# Patient Record
Sex: FEMALE | Race: WHITE | NOT HISPANIC OR LATINO | Employment: FULL TIME | URBAN - METROPOLITAN AREA
[De-identification: names, ages, dates, MRNs, and addresses within clinical notes are randomized per-mention and may not be internally consistent; named-entity substitution may affect disease eponyms.]

---

## 2017-12-04 ENCOUNTER — OFFICE VISIT (OUTPATIENT)
Dept: URGENT CARE | Facility: CLINIC | Age: 22
End: 2017-12-04

## 2017-12-04 PROCEDURE — G0381 LEV 2 HOSP TYPE B ED VISIT: HCPCS

## 2017-12-06 NOTE — PROGRESS NOTES
Assessment    1  Acute tonsillitis (463) (J03 90)    Plan  Acute tonsillitis    · Amoxicillin 875 MG Oral Tablet; TAKE 1 TABLET EVERY 12 HOURS DAILY   · Fluconazole 150 MG Oral Tablet (Diflucan); TAKE 1 TABLET 1 TIME ONLY    Discussion/Summary  Discussion Summary:   Complete amoxicillin as directed  take a probiotic while you are on the antibioticgargles  versus Advil as needed for sore throat symptoms  up with her family doctor if symptoms persist or worsen over the next 3-4 days  Take Diflucan if needed for yeast infection with the antibiotics  Medication Side Effects Reviewed: Possible side effects of new medications were reviewed with the patient/guardian today  Understands and agrees with treatment plan: The treatment plan was reviewed with the patient/guardian  The patient/guardian understands and agrees with the treatment plan      Chief Complaint    1  Sore Throat  Chief Complaint Free Text Note Form: Sore throat started 3 days ago      History of Present Illness  HPI: Patient presents with mother with complaints of sore throat for 3 days  She states she has a history of recurrent strep  She has some left over cephalexin that she started and has taken a few doses  She is a nonsmoker  She denies any extreme fatigue  She denies any associated nausea or vomiting  Hospital Based Practices Required Assessment:  Pain Assessment  the patient states they have pain  The pain is located in the throat  The patient describes the pain as sharp  (on a scale of 0 to 10, the patient rates the pain at 8 )  Abuse And Domestic Violence Screen   Yes, the patient is safe at home  -- The patient states no one is hurting them  Depression And Suicide Screen  No, the patient has not had thoughts of hurting themself  No, the patient has not felt depressed in the past 7 days  Review of Systems  Focused-Female:  Constitutional: No fever, no chills, feels well, no tiredness, no recent weight gain or loss  ENT: sore throat  Cardiovascular: no complaints of slow or fast heart rate, no chest pain, no palpitations, no leg claudication or lower extremity edema  Respiratory: no complaints of shortness of breath, no wheezing, no dyspnea on exertion, no orthopnea or PND  Gastrointestinal: no complaints of abdominal pain, no constipation, no nausea or diarrhea, no vomiting, no bloody stools  Integumentary: no rashes  Past Medical History  1  No pertinent past medical history  Active Problems And Past Medical History Reviewed: The active problems and past medical history were reviewed and updated today  Family History  Family History Reviewed: The family history was reviewed and updated today  Social History   · Never a smoker  Social History Reviewed: The social history was reviewed and updated today  Surgical History    1  History of Cholecystectomy   2  History of Hip Surgery  Surgical History Reviewed: The surgical history was reviewed and updated today  Current Meds   1  Ceftin TABS; Therapy: (Recorded:[id number]) to Recorded  Medication List Reviewed: The medication list was reviewed and updated today  Allergies    1  No Known Drug Allergies    Vitals  Signs   Recorded: [id number] 03:32PM   Temperature: 97 1 F  Heart Rate: 76  Respiration: 20  Systolic: 818  Diastolic: 76  Height: 5 ft 3 in  Weight: 243 lb   BMI Calculated: 43 05  BSA Calculated: 2 1  O2 Saturation: 98    Physical Exam   Constitutional  General appearance: No acute distress, well appearing and well nourished  Eyes  Conjunctiva and lids: No swelling, erythema or discharge  Ears, Nose, Mouth, and Throat  External inspection of ears and nose: Normal    Otoscopic examination: Tympanic membranes translucent with normal light reflex  Canals patent without erythema  Nasal mucosa, septum, and turbinates: Normal without edema or erythema  -- Enlarged tonsils non kissing  Slight exudate    Pulmonary  Respiratory effort: No increased work of breathing or signs of respiratory distress  Auscultation of lungs: Clear to auscultation  Cardiovascular  Palpation of heart: Normal PMI, no thrills  Auscultation of heart: Normal rate and rhythm, normal S1 and S2, without murmurs  Examination of extremities for edema and/or varicosities: Normal    Abdomen  Abdomen: Non-tender, no masses  Liver and spleen: No hepatomegaly or splenomegaly         Signatures   Electronically signed by : Luis Carlos Butler DO; Dec  4 2017  3:54PM EST                       (Author)

## 2018-01-08 ENCOUNTER — OFFICE VISIT (OUTPATIENT)
Dept: URGENT CARE | Facility: CLINIC | Age: 23
End: 2018-01-08

## 2018-01-09 NOTE — PROGRESS NOTES
Assessment   1  's permit physical examination (V70 3) (Z02 4)    Plan   Acute tonsillitis    · Amoxicillin 875 MG Oral Tablet   · Fluconazole 150 MG Oral Tablet (Diflucan)    Discussion/Summary   Discussion Summary:    NORMAL 'S PHYSICAL EXAM  PATIENT IS 'S FORM COMPLETED WITH NO RESTRICTIONS AT THIS TIME +  Chief Complaint   Chief Complaint Free Text Note Form: PT here for driving license physical      History of Present Illness   HPI: Patient here for 's permit physical exam  Denies any complaints  Denies any history of seizure disorder, or cardiac disorder  Review of Systems   Focused-Female:      Constitutional: No fever, no chills, feels well, no tiredness, no recent weight gain or loss  ENT: no ear ache, no loss of hearing, no nosebleeds or nasal discharge, no sore throat or hoarseness  Cardiovascular: no complaints of slow or fast heart rate, no chest pain, no palpitations, no leg claudication or lower extremity edema  Respiratory: no complaints of shortness of breath, no wheezing, no dyspnea on exertion, no orthopnea or PND  Breasts: no complaints of breast pain, breast lump or nipple discharge  Gastrointestinal: no complaints of abdominal pain, no constipation, no nausea or diarrhea, no vomiting, no bloody stools  Genitourinary: no complaints of dysuria, no incontinence, no pelvic pain, no dysmenorrhea, no vaginal discharge or abnormal vaginal bleeding  Musculoskeletal: no complaints of arthralgia, no myalgia, no joint swelling or stiffness, no limb pain or swelling  Integumentary: no complaints of skin rash or lesion, no itching or dry skin, no skin wounds  Neurological: no complaints of headache, no confusion, no numbness or tingling, no dizziness or fainting  Active Problems   1  Acute tonsillitis (463) (J03 90)    Past Medical History   1   No pertinent past medical history  Active Problems And Past Medical History Reviewed: The active problems and past medical history were reviewed and updated today  Family History   Mother    1  No pertinent family history  Father    2  No pertinent family history  Family History    3  No pertinent family history  Family History Reviewed: The family history was reviewed and updated today  Social History    · Never a smoker    Surgical History   1  History of Cholecystectomy   2  History of Hip Surgery    Current Meds    1  Amoxicillin 875 MG Oral Tablet; TAKE 1 TABLET EVERY 12 HOURS DAILY; Therapy: [id number] to (Evaluate:[id number])  Requested for: [id number]; Last     Rx:[id number] Ordered   2  Fluconazole 150 MG Oral Tablet; TAKE 1 TABLET 1 TIME ONLY; Therapy: [id number] to (Loreda Dapper)  Requested for: [id number]; Last     Rx:[id number] Ordered  Medication List Reviewed: The medication list was reviewed and updated today  Allergies   1  No Known Drug Allergies    Vitals   Signs   Recorded: [id number] 11:33AM   Temperature: 98 9 F  Heart Rate: 76  Respiration: 20  Systolic: 263  Diastolic: 90  Height: 5 ft 3 in  Weight: 245 lb 6 4 oz  BMI Calculated: 43 47  BSA Calculated: 2 11  O2 Saturation: 98    Physical Exam        Constitutional      General appearance: No acute distress, well appearing and well nourished  Eyes      Conjunctiva and lids: No swelling, erythema or discharge  Pupils and irises: Equal, round and reactive to light  Ears, Nose, Mouth, and Throat      External inspection of ears and nose: Normal        Otoscopic examination: Tympanic membranes translucent with normal light reflex  Canals patent without erythema  Nasal mucosa, septum, and turbinates: Normal without edema or erythema  Oropharynx: Normal with no erythema, edema, exudate or lesions  Pulmonary      Respiratory effort: No increased work of breathing or signs of respiratory distress  Auscultation of lungs: Clear to auscultation  Cardiovascular      Palpation of heart: Normal PMI, no thrills  Auscultation of heart: Normal rate and rhythm, normal S1 and S2, without murmurs  Examination of extremities for edema and/or varicosities: Normal        Abdomen      Abdomen: Non-tender, no masses  Liver and spleen: No hepatomegaly or splenomegaly  Lymphatic      Palpation of lymph nodes in neck: No lymphadenopathy  Musculoskeletal      Gait and station: Normal        Digits and nails: Normal without clubbing or cyanosis  Inspection/palpation of joints, bones, and muscles: Normal        Skin      Skin and subcutaneous tissue: Normal without rashes or lesions  Neurologic      Cranial nerves: Cranial nerves 2-12 intact  Reflexes: 2+ and symmetric  Sensation: No sensory loss         Psychiatric      Orientation to person, place, and time: Normal        Mood and affect: Normal        Signatures    Electronically signed by : MEÑO Bowman ; Jan 8 2018 12:46PM EST                       (Author)

## 2018-01-23 VITALS
OXYGEN SATURATION: 98 % | SYSTOLIC BLOOD PRESSURE: 130 MMHG | DIASTOLIC BLOOD PRESSURE: 90 MMHG | HEART RATE: 76 BPM | TEMPERATURE: 98.9 F | WEIGHT: 245.4 LBS | BODY MASS INDEX: 43.48 KG/M2 | RESPIRATION RATE: 20 BRPM | HEIGHT: 63 IN

## 2018-01-23 VITALS
WEIGHT: 243 LBS | HEIGHT: 63 IN | DIASTOLIC BLOOD PRESSURE: 76 MMHG | RESPIRATION RATE: 20 BRPM | HEART RATE: 76 BPM | OXYGEN SATURATION: 98 % | TEMPERATURE: 97.1 F | SYSTOLIC BLOOD PRESSURE: 113 MMHG | BODY MASS INDEX: 43.05 KG/M2